# Patient Record
Sex: MALE | Race: WHITE | NOT HISPANIC OR LATINO | Employment: FULL TIME | URBAN - METROPOLITAN AREA
[De-identification: names, ages, dates, MRNs, and addresses within clinical notes are randomized per-mention and may not be internally consistent; named-entity substitution may affect disease eponyms.]

---

## 2017-05-31 ENCOUNTER — COMMUNICATION - HEALTHEAST (OUTPATIENT)
Dept: TELEHEALTH | Facility: CLINIC | Age: 32
End: 2017-05-31

## 2017-05-31 ENCOUNTER — OFFICE VISIT - HEALTHEAST (OUTPATIENT)
Dept: INTERNAL MEDICINE | Facility: CLINIC | Age: 32
End: 2017-05-31

## 2017-05-31 DIAGNOSIS — Z00.00 ANNUAL PHYSICAL EXAM: ICD-10-CM

## 2017-05-31 DIAGNOSIS — E55.9 VITAMIN D DEFICIENCY: ICD-10-CM

## 2017-05-31 DIAGNOSIS — L05.91 PILONIDAL CYST: ICD-10-CM

## 2017-05-31 LAB
CHOLEST SERPL-MCNC: 170 MG/DL
FASTING STATUS PATIENT QL REPORTED: YES
HDLC SERPL-MCNC: 35 MG/DL
LDLC SERPL CALC-MCNC: 119 MG/DL
TRIGL SERPL-MCNC: 79 MG/DL

## 2017-06-06 ENCOUNTER — COMMUNICATION - HEALTHEAST (OUTPATIENT)
Dept: INTERNAL MEDICINE | Facility: CLINIC | Age: 32
End: 2017-06-06

## 2017-06-06 DIAGNOSIS — E55.9 VITAMIN D INSUFFICIENCY: ICD-10-CM

## 2017-06-07 ENCOUNTER — OFFICE VISIT - HEALTHEAST (OUTPATIENT)
Dept: SURGERY | Facility: CLINIC | Age: 32
End: 2017-06-07

## 2017-06-07 DIAGNOSIS — L05.91 PILONIDAL CYST: ICD-10-CM

## 2017-06-21 ENCOUNTER — AMBULATORY - HEALTHEAST (OUTPATIENT)
Dept: SURGERY | Facility: CLINIC | Age: 32
End: 2017-06-21

## 2017-06-21 DIAGNOSIS — L05.91 PILONIDAL CYST: ICD-10-CM

## 2017-06-21 ASSESSMENT — MIFFLIN-ST. JEOR: SCORE: 2024.88

## 2020-07-23 ENCOUNTER — APPOINTMENT (OUTPATIENT)
Dept: GENERAL RADIOLOGY | Facility: CLINIC | Age: 35
End: 2020-07-23
Attending: PHYSICIAN ASSISTANT
Payer: OTHER GOVERNMENT

## 2020-07-23 ENCOUNTER — HOSPITAL ENCOUNTER (EMERGENCY)
Facility: CLINIC | Age: 35
Discharge: HOME | End: 2020-07-23
Attending: PHYSICIAN ASSISTANT | Admitting: PHYSICIAN ASSISTANT
Payer: OTHER GOVERNMENT

## 2020-07-23 ENCOUNTER — APPOINTMENT (OUTPATIENT)
Dept: ULTRASOUND IMAGING | Facility: CLINIC | Age: 35
End: 2020-07-23
Attending: PHYSICIAN ASSISTANT
Payer: OTHER GOVERNMENT

## 2020-07-23 VITALS
WEIGHT: 236 LBS | DIASTOLIC BLOOD PRESSURE: 98 MMHG | TEMPERATURE: 98.2 F | HEART RATE: 80 BPM | SYSTOLIC BLOOD PRESSURE: 140 MMHG | RESPIRATION RATE: 18 BRPM | OXYGEN SATURATION: 96 % | BODY MASS INDEX: 32.01 KG/M2

## 2020-07-23 DIAGNOSIS — R07.9 CHEST PAIN: ICD-10-CM

## 2020-07-23 LAB
ALBUMIN SERPL-MCNC: 4 G/DL (ref 3.4–5)
ALP SERPL-CCNC: 91 U/L (ref 40–150)
ALT SERPL W P-5'-P-CCNC: 178 U/L (ref 0–70)
ANION GAP SERPL CALCULATED.3IONS-SCNC: 6 MMOL/L (ref 3–14)
AST SERPL W P-5'-P-CCNC: 107 U/L (ref 0–45)
BASOPHILS # BLD AUTO: 0.1 10E9/L (ref 0–0.2)
BASOPHILS NFR BLD AUTO: 0.8 %
BILIRUB DIRECT SERPL-MCNC: 0.2 MG/DL (ref 0–0.2)
BILIRUB SERPL-MCNC: 0.7 MG/DL (ref 0.2–1.3)
BUN SERPL-MCNC: 13 MG/DL (ref 7–30)
CALCIUM SERPL-MCNC: 9.2 MG/DL (ref 8.5–10.1)
CHLORIDE SERPL-SCNC: 108 MMOL/L (ref 94–109)
CO2 SERPL-SCNC: 25 MMOL/L (ref 20–32)
CREAT SERPL-MCNC: 0.99 MG/DL (ref 0.66–1.25)
DIFFERENTIAL METHOD BLD: ABNORMAL
EOSINOPHIL # BLD AUTO: 0.1 10E9/L (ref 0–0.7)
EOSINOPHIL NFR BLD AUTO: 1.2 %
ERYTHROCYTE [DISTWIDTH] IN BLOOD BY AUTOMATED COUNT: 12.4 % (ref 10–15)
GFR SERPL CREATININE-BSD FRML MDRD: >90 ML/MIN/{1.73_M2}
GLUCOSE SERPL-MCNC: 92 MG/DL (ref 70–99)
HCT VFR BLD AUTO: 53 % (ref 40–53)
HGB BLD-MCNC: 17.8 G/DL (ref 13.3–17.7)
IMM GRANULOCYTES # BLD: 0 10E9/L (ref 0–0.4)
IMM GRANULOCYTES NFR BLD: 0.4 %
LIPASE SERPL-CCNC: 102 U/L (ref 73–393)
LYMPHOCYTES # BLD AUTO: 2 10E9/L (ref 0.8–5.3)
LYMPHOCYTES NFR BLD AUTO: 26.4 %
MCH RBC QN AUTO: 30.8 PG (ref 26.5–33)
MCHC RBC AUTO-ENTMCNC: 33.6 G/DL (ref 31.5–36.5)
MCV RBC AUTO: 92 FL (ref 78–100)
MONOCYTES # BLD AUTO: 1.2 10E9/L (ref 0–1.3)
MONOCYTES NFR BLD AUTO: 15.9 %
NEUTROPHILS # BLD AUTO: 4.2 10E9/L (ref 1.6–8.3)
NEUTROPHILS NFR BLD AUTO: 55.3 %
NRBC # BLD AUTO: 0 10*3/UL
NRBC BLD AUTO-RTO: 0 /100
PLATELET # BLD AUTO: 175 10E9/L (ref 150–450)
POTASSIUM SERPL-SCNC: 3.8 MMOL/L (ref 3.4–5.3)
PROT SERPL-MCNC: 8.4 G/DL (ref 6.8–8.8)
RBC # BLD AUTO: 5.78 10E12/L (ref 4.4–5.9)
SODIUM SERPL-SCNC: 138 MMOL/L (ref 133–144)
TROPONIN I SERPL-MCNC: <0.015 UG/L (ref 0–0.04)
WBC # BLD AUTO: 7.5 10E9/L (ref 4–11)

## 2020-07-23 PROCEDURE — U0003 INFECTIOUS AGENT DETECTION BY NUCLEIC ACID (DNA OR RNA); SEVERE ACUTE RESPIRATORY SYNDROME CORONAVIRUS 2 (SARS-COV-2) (CORONAVIRUS DISEASE [COVID-19]), AMPLIFIED PROBE TECHNIQUE, MAKING USE OF HIGH THROUGHPUT TECHNOLOGIES AS DESCRIBED BY CMS-2020-01-R: HCPCS | Performed by: PHYSICIAN ASSISTANT

## 2020-07-23 PROCEDURE — 85025 COMPLETE CBC W/AUTO DIFF WBC: CPT | Performed by: PHYSICIAN ASSISTANT

## 2020-07-23 PROCEDURE — 71046 X-RAY EXAM CHEST 2 VIEWS: CPT

## 2020-07-23 PROCEDURE — 76705 ECHO EXAM OF ABDOMEN: CPT

## 2020-07-23 PROCEDURE — 83690 ASSAY OF LIPASE: CPT | Performed by: PHYSICIAN ASSISTANT

## 2020-07-23 PROCEDURE — 80076 HEPATIC FUNCTION PANEL: CPT | Performed by: PHYSICIAN ASSISTANT

## 2020-07-23 PROCEDURE — 80048 BASIC METABOLIC PNL TOTAL CA: CPT | Performed by: PHYSICIAN ASSISTANT

## 2020-07-23 PROCEDURE — 99285 EMERGENCY DEPT VISIT HI MDM: CPT | Mod: 25

## 2020-07-23 PROCEDURE — C9803 HOPD COVID-19 SPEC COLLECT: HCPCS

## 2020-07-23 PROCEDURE — 84484 ASSAY OF TROPONIN QUANT: CPT | Performed by: PHYSICIAN ASSISTANT

## 2020-07-23 RX ORDER — SUCRALFATE ORAL 1 G/10ML
1 SUSPENSION ORAL 4 TIMES DAILY
Qty: 280 ML | Refills: 0 | Status: SHIPPED | OUTPATIENT
Start: 2020-07-23 | End: 2020-07-30

## 2020-07-23 ASSESSMENT — ENCOUNTER SYMPTOMS
NAUSEA: 0
VOMITING: 0
COUGH: 0
DIARRHEA: 0
CHILLS: 1
DIAPHORESIS: 1
BLOOD IN STOOL: 0
RHINORRHEA: 0
WHEEZING: 0
SHORTNESS OF BREATH: 1
SORE THROAT: 0
FEVER: 1

## 2020-07-23 NOTE — ED PROVIDER NOTES
History     Chief Complaint:  Shortness of Breath      HPI   Gage Clarke is a 35 year old male who presents with multiple complaints. 5 days ago he had onset of subjective fever, chills, and sweats. The following day he experienced onset of epigastric/lower central chest pain best described as heart burn. This temporarily would resolve with Pepcid/Tums however over the last 3 days the episodes would become more frequent. Yesterday the pain became constant. He also notes coinciding shortness of breath with exertion. Denies congestion, rhinorrhea, sore throat, coughing, wheezing, nausea, vomiting, diarrhea, constipation, dark or bloody stools, urinary changes.  Admits to ETOH daily.     Allergies:  Penicillins    Medications:    The patient is not currently taking any prescribed medications.    Past Medical History:    Eustachian tube dysfunction, bilateral  Depression with anxiety    Past Surgical History:    Cystectomy, pilonidal  Warners teeth extraction  Tonsillectomy  Ear tube placements    Family History:    Mother: Hypertension  Father: Hypertension    Social History:  Smoking Status: Current Every Day moker  Smokeless Tobacco: Never Used  Alcohol Use: Positive  Drug Use: Negative  Marital Status:      Review of Systems   Constitutional: Positive for chills, diaphoresis and fever.   HENT: Negative for congestion, rhinorrhea and sore throat.    Respiratory: Positive for shortness of breath. Negative for cough and wheezing.    Cardiovascular: Positive for chest pain.   Gastrointestinal: Negative for blood in stool, diarrhea, nausea and vomiting.   All other systems reviewed and are negative.    Physical Exam     Patient Vitals for the past 24 hrs:   BP Temp Temp src Pulse Heart Rate Resp SpO2 Weight   07/23/20 1410 -- -- -- -- -- -- 96 % --   07/23/20 1405 -- -- -- -- -- -- 96 % --   07/23/20 1400 (!) 135/100 -- -- 65 -- -- 98 % --   07/23/20 1345 (!) 137/98 -- -- 76 -- -- 97 % --   07/23/20 1330 (!)  140/102 -- -- -- -- -- -- --   07/23/20 1322 (!) 149/108 98.2  F (36.8  C) Oral -- 68 16 97 % 107 kg (236 lb)     Physical Exam  Vitals signs and nursing note reviewed.   Constitutional:       General: He is not in acute distress.     Appearance: He is not diaphoretic.   HENT:      Head: Normocephalic and atraumatic.   Eyes:      General: No scleral icterus.     Extraocular Movements: Extraocular movements intact.   Cardiovascular:      Rate and Rhythm: Normal rate.      Pulses: Normal pulses.      Heart sounds: Normal heart sounds.   Pulmonary:      Effort: Pulmonary effort is normal. No respiratory distress.      Breath sounds: Normal breath sounds.   Abdominal:      General: Bowel sounds are normal. There is no distension.      Palpations: Abdomen is soft.      Tenderness: There is abdominal tenderness in the epigastric area. There is no guarding.   Musculoskeletal:         General: No tenderness.   Skin:     General: Skin is warm.      Findings: No rash.   Neurological:      Mental Status: He is alert.       Emergency Department Course     ECG:  ECG taken at 1332, ECG read at 1337  Normal sinus rhythm  Normal ECG  Rate 78 bpm. RI interval 128 ms. QRS duration 86 ms. QT/QTc 374/426 ms. P-R-T axes 39 18 36.    Imaging:  Radiology findings were communicated with the patient who voiced understanding of the findings.    US Abdomen Limited  1.  Normal appearance of the gallbladder. No gallstones. Negative  sonographic Levin sign. No biliary dilatation.  2.  Fatty liver. Enlarged liver.  Reading per radiology.    Chest XR,  PA & LAT  Heart size is normal. No pleural effusion, pneumothorax,  or abnormal area of consolidation.  Reading per radiology.    Laboratory:  Laboratory findings were communicated with the patient who voiced understanding of the findings.    CBC: WBC 7.5, HGB 17.8(H),   BMP: WNL (Creatinine 0.99)  Hepatic panel: Bili Direct 0.2, Bili Total 0.7, Albumin 4.0, Protein Total 8.4, Alkphos 91,  (H), (H)  Troponin (Collected 1337): <0.015  Lipase: 102    Symptomatic COVID-19 by PCR: In process      Emergency Department Course:    1324 Nursing notes and vitals reviewed.    1329 I performed an exam of the patient as documented above.     1337 IV was inserted and blood was drawn for laboratory testing, results above. Patient swabbed for COVID-19.    1415 The patient was sent for an US while in the emergency department, results above.     1427 The patient was sent for an XR while in the emergency department, results above.     Patient rechecked and updated.     1550 Findings and plan explained to the patient. Patient discharged home with instructions regarding supportive care, medications, and reasons to return. The importance of close follow-up was reviewed. The patient was prescribed omeprazole and Carafate.    Impression & Plan      Medical Decision Making:  Gage Clarke is a 35 year old male who presents to the emergency department today for evaluation of chest/abdominal pain. He voices symptoms being chest pain however location of discomfort appears epigastric. His symptoms are likely 2/2 GI source however he does voice some exertional dyspnea.   EKG is unremarkable without STEMI or dysrhythmia. Given the duration of chest pain, single troponin appears adequate for evaluation of NSTEMI. Clinically   HEART score low risk, low suspicion for ACS/CAD at this time. Wells low risk, PERC negative, and pulmonary embolism considered but unlikely. Denies symptoms most suggestive of aortic dissection or expanding aneurysm. CXR is without pneumothorax, CHF/pulmonary edema, pneumonia. Clinically not most c/w cardiac tamponade. No findings of pancreatitis, cholelithiasis, choledocholithiasis at this time. LFT likely 2/2 alcohol use. COVID19 testing sent, no evidence of respiratory distress or increased effort at this time.     Covid-19   Gage Clarke was evaluated during a global COVID-19 pandemic, which  necessitated consideration that the patient might be at risk for infection with the SARS-CoV-2 virus that causes COVID-19.   Applicable protocols for evaluation were followed during the patient's care.   COVID-19 was considered as part of the patient's evaluation. The plan for testing is:  a test was obtained during this visit.      Diagnosis:    ICD-10-CM    1. Chest pain  R07.9      Disposition:   Patient was discharged home.    Discharge Medications:  New Prescriptions    OMEPRAZOLE (PRILOSEC) 20 MG DR CAPSULE    Take 1 capsule (20 mg) by mouth daily for 28 days    SUCRALFATE (CARAFATE) 1 GM/10ML SUSPENSION    Take 10 mLs (1 g) by mouth 4 times daily for 7 days       Scribe Disclosure:  I, Micheal Diez, am serving as a scribe at 1:26 PM on 7/23/2020 to document services personally performed by Kev Severino PA based on my observations and the provider's statements to me.    [facility] EMERGENCY DEPARTMENT       Kev Severino PA-C  07/23/20 0525

## 2020-07-23 NOTE — ED TRIAGE NOTES
A&O x4, ABCs intact. Pt presents with chills, SOB, cough, and HA fof the past 4 days. Pt states that he feels feverish, but denies checking his temperature. Pt also has some midsternal chest pain. Laying on his back helps with the chest pain.

## 2020-07-23 NOTE — ED AVS SNAPSHOT
[facility] Emergency Department  [address]  [facility] [address]  Phone:  [phone]  Fax:  [phone]                                    Gage Clarke   MRN: [id number]    Department:  [facility] Emergency Department   Date of Visit:  7/23/2020           After Visit Summary Signature Page    I have received my discharge instructions, and my questions have been answered. I have discussed any challenges I see with this plan with the nurse or doctor.    ..........................................................................................................................................  Patient/Patient Representative Signature      ..........................................................................................................................................  Patient Representative Print Name and Relationship to Patient    ..................................................               ................................................  Date                                   Time    ..........................................................................................................................................  Reviewed by Signature/Title    ...................................................              ..............................................  Date                                               Time          22EPIC Rev 08/18

## 2020-07-24 DIAGNOSIS — Z71.89 COUNSELING AND COORDINATION OF CARE: Primary | ICD-10-CM

## 2020-07-24 LAB
INTERPRETATION ECG - MUSE: NORMAL
SARS-COV-2 RNA SPEC QL NAA+PROBE: NOT DETECTED
SPECIMEN SOURCE: NORMAL

## 2020-07-24 NOTE — PROGRESS NOTES
Received notification of ED visit with COVID -19 testing done and no PCP listed for follow up care. Referral for care coordination services entered to outreach to patient.    Jessica Puga RN  [facility] Primary [facility]-Care Coordination  [facility]-Integrated Primary Care  [facility]  [phone]

## 2020-07-28 ENCOUNTER — PATIENT OUTREACH (OUTPATIENT)
Dept: CARE COORDINATION | Facility: CLINIC | Age: 35
End: 2020-07-28

## 2020-07-28 DIAGNOSIS — Z20.822 COVID-19 RULED OUT: Primary | ICD-10-CM

## 2020-07-28 NOTE — PROGRESS NOTES
Clinic Care Coordination Contact  [facility]/Voicemail       Clinical Data: Care Coordinator Outreach. Received notification of ED visit with COVID -19 testing done and no PCP listed for follow up care. Referral for care coordination services entered to outreach to patient.    Outreach attempted x 1.  Left message on patient's voicemail with call back information and requested return call.  Plan: Care Coordinator will try to reach patient again in 1-2 business days.    Jessica Puga RN  [facility] Primary Care-Care Coordination  [facility]-Integrated Primary Care  [facility]  [phone]

## 2020-07-30 NOTE — PROGRESS NOTES
Clinic Care Coordination Contact  [facility]/Voicemail       Clinical Data: Care Coordinator Outreach  Outreach attempted x 2.  Left message on patient's voicemail with phone number for [facility] should he need care and not have an established primary care provider  Plan:  Care Coordinator will do no further outreaches at this time.    Jessica Puga RN  [facility] Primary Care-Care Coordination  [facility]  [facility]  [phone]

## 2020-11-29 ENCOUNTER — HEALTH MAINTENANCE LETTER (OUTPATIENT)
Age: 35
End: 2020-11-29

## 2021-05-31 VITALS — BODY MASS INDEX: 31.9 KG/M2 | WEIGHT: 235.2 LBS

## 2021-05-31 VITALS — BODY MASS INDEX: 31.56 KG/M2 | HEIGHT: 72 IN | WEIGHT: 233 LBS

## 2021-05-31 VITALS — BODY MASS INDEX: 31.49 KG/M2 | WEIGHT: 232.2 LBS

## 2021-06-10 NOTE — PROGRESS NOTES
Subjective:     Gage Clarke is a 31 y.o. male who presents for an annual exam.     He had pilodynal cyst removed in 2010 and this past weekend his lower back started to get sore, and he had a fall down some stairs, yesterday morning woke up around 4 am with blood and fluid coming out. He says it will usually start draining and reappear every 4 moths or so, would like to talk about potential for surgery    The patient reports that there is not domestic violence in his life.     Healthy Habits:   Regular Exercise: No  Sunscreen Use: Yes  Healthy Diet: No, a lot of meat, not much veggies  Dental Visits Regularly: Yes  Sexually active: Yes  Colonoscopy: No    Immunization History   Administered Date(s) Administered     DTaP, historic 1984, 1984, 01/10/1985, 04/12/1989, 11/22/1989, 04/10/1996     Hep B, historic 04/10/1996, 05/21/1996, 12/13/1996     IPV 1984, 1984, 01/10/1985, 04/12/1989     Influenza, inj, historic 10/31/2007, 02/28/2011     Influenza,live, Nasal Laiv4 10/18/2013     MMR 07/22/1986, 04/10/1996     Pneumo Polysac 23-V 02/28/2011     Td, historic 04/10/1996     Tdap 10/31/2007, 08/14/2012     Immunization status: up to date and documented.    No current outpatient prescriptions on file.     No current facility-administered medications for this visit.      No past medical history on file.  Past Surgical History:   Procedure Laterality Date     PILONIDAL CYST / SINUS EXCISION  2011     [place] REMV PILONIDAL LESION SIMPLE      Description: Pilonidal Cyst Resection;  Recorded: 10/18/2013;     Penicillins  Family History   Problem Relation Age of Onset     Hypertension Mother      Hypertension Father      Hypertension Maternal Grandmother      Hypertension Maternal Grandfather      Diabetes Maternal Grandfather      Hypertension Paternal Grandmother      Hypertension Paternal Grandfather      Social History     Social History     Marital status: Single     Spouse name: N/A     Number  of children: N/A     Years of education: N/A     Occupational History     Not on file.     Social History Main Topics     Smoking status: Current Every Day Smoker     Packs/day: 0.50     Smokeless tobacco: Not on file     Alcohol use 6.0 oz/week     10 Cans of beer per week     Drug use: Not on file     Sexual activity: Not on file     Other Topics Concern     Not on file     Social History Narrative       Review of Systems  General:  Negative except as noted above  Eyes: Negative except as noted above  Ears/Nose/Throat: Negative except as noted above  Cardiovascular: Negative except as noted above  Respiratory:  Negative except as noted above  Gastrointestinal:  Negative except as noted above  Genitourinary: Negative except as noted above  Musculoskeletal:  Negative except as noted above  Skin: Negative except as noted above  Neurologic: Negative except as noted above  Psychiatric: Negative except as noted above  Endocrine: Negative except as noted above  Heme/Lymphatic:Negative except as noted above   Allergic/Immunologic: Negative except as noted above      Objective:     /72 (Patient Site: Right Arm, Patient Position: Sitting, Cuff Size: Adult Large)  Pulse 76  Wt (!) 232 lb 3.2 oz (105.3 kg)  BMI 31.49 kg/m2    Physical  General Appearance: Alert, cooperative, no distress, appears stated age  Head: Normocephalic, without obvious abnormality, atraumatic  Eyes: conjunctiva/corneas clear, EOM's intact  Ears: Normal TM's and external ear canals, both ears  Nose: Nares normal, septum midline,mucosa normal, no drainage  Throat: Lips, mucosa, and tongue normal.  Neck: Supple, symmetrical, trachea midline, no adenopathy;  thyroid: not enlarged, symmetric, no tenderness/mass/nodules.  Back: Symmetric, no curvature, ROM normal, no CVA tenderness  Lungs: Clear to auscultation bilaterally, respirations unlabored  Heart: Regular rate and rhythm, S1 and S2 normal, no murmur, rub, or gallop,  Abdomen: Soft,  non-tender, bowel sounds active all four quadrants,  no masses, no organomegaly  Genitourinary: Penis normal.  No scrotal masses.  No hernias palpated.  Musculoskeletal: Normal range of motion. No joint swelling or deformity.   Extremities: no cyanosis or edema  Skin: 1.5 cm draining pilonidal cyst, saginous/clear fluid, no streaking or surrounding erythema.   Lymph nodes: Cervical, supraclavicular, and axillary nodes normal  Neurologic: He is alert. no gross neurological deficits   Psychiatric: He has a normal mood and affect.     Assessment:     Healthy male exam.    1. Annual physical exam  Lipid Cascade    Glucose    Glucose   2. Vitamin D deficiency  Vitamin D, Total (25-Hydroxy)   3. Pilonidal cyst  Ambulatory referral to General Surgery        Plan:      Actively draining pilonidal cyst - given frequent recurrence will refer to surgery for evaluation - discussed wound care and dressing changes. No signs of infection.   Recheck Vit D  recheck lipid panel  Glucose  Dicussed healthy diet and excercise  I recommended he eat a healthy diet and get regular exercise.    Reji Davidson MD  [facility]

## 2021-06-11 NOTE — PROGRESS NOTES
GENERAL SURGICAL CONSULTATION    I was requested by Reji Davidson MD to consult on this pt to evaluate them for recurrent santo cyst    HPI:  This is a 31 y.o. male here today with a santo cyst which was troubling him around 2010.  He endend up having the cyst excised with packing of the wound in 2011.  He feels this wound never completely healed.  About 3 weeks ago this area got sore and he was having some drainage.  2 weeks ago it became so sore that he could not even sit.  It opened up and drained about a week ago.  He has not started on any antibiotics.       Allergies:Penicillins    No past medical history on file.    Past Surgical History:   Procedure Laterality Date     PILONIDAL CYST / SINUS EXCISION  2011     [place] REMV PILONIDAL LESION SIMPLE      Description: Pilonidal Cyst Resection;  Recorded: 10/18/2013;       CURRENT MEDS:  Current Outpatient Prescriptions   Medication Sig Dispense Refill     ergocalciferol (ERGOCALCIFEROL) 50,000 unit capsule Take 1 capsule (50,000 Units total) by mouth once a week for 8 doses. 8 capsule 0     No current facility-administered medications for this visit.        Family History   Problem Relation Age of Onset     Hypertension Mother      Hypertension Father      Hypertension Maternal Grandmother      Hypertension Maternal Grandfather      Diabetes Maternal Grandfather      Hypertension Paternal Grandmother      Hypertension Paternal Grandfather      Family history is not pertinent to this patients Chief Complaint.     reports that he has been smoking.  He has been smoking about 0.50 packs per day. He does not have any smokeless tobacco history on file. He reports that he drinks about 6.0 oz of alcohol per week     Review of Systems -   10 point Review of systems is negative except for; as mentioned above in HPI and PMHx    BP (!) 142/91  Pulse 89  Wt (!) 235 lb 3.2 oz (106.7 kg)  SpO2 94%  BMI 31.9 kg/m2  Body mass index is 31.9 kg/(m^2).    EXAM:  GENERAL: Well  developed male  HEENT: EOMI, Anicteric Sclera, Moist Mucous Membranes,  In Mouth the pt does not have redness or bleeding gums  CARDIOVASCULAR: RRR w/out murmur   CHEST/LUNG: Clear to Auscultation  ABDOMEN:  Non tender to palpation, +BS  MUSCULOSKELETAL:  No deformities with good range of motion in all extremities  NEURO: He is ambulatory with good strength in both legs.  HEME/LYMPH: No Cervical Adenopathy or tenderness.   Rectal exam: In the intergluteal cleft overlying the coccyx the patient has an area which is draining purulent fluid.  The fluid drainage looks to be coming from the wound right in the central part of the healing incision.    IMAGES:  None    Assessment/Plan:  Patient status post excision of a pilonidal cyst.  The upper portion of this healed area was pilonidal cyst used to reside has developed an infection.  This patient would benefit from opening and debriding this part of the wound.  By packing the wound, the edges of this area should heal back in nicely.    The patient was given instructions to do wet-to-dry dressing changes and to pack this wound daily.  He was told it is okay to shower or bathe and then pack the wound after bathing.  He should follow-up with me in 2 weeks to evaluate his progress.      Procedure note:  The skin and infected areas prepped with Betadine  The tissue overlying the abscess was cut open with an 11 blade scalpel.  The wound was swabbed out with gauze.  The wound was packed with a 2 x 2 gauze covered with gauze and tape.      Jf Vinson MD  [facility]  [phone]

## 2021-06-11 NOTE — PROGRESS NOTES
"HPI: Gage Clarke is a 31 y.o. male who is following up today after a incision and drainage of a pilonidal cyst on June 6th. He is packing the wound with nugauze daily. No fevers or chills.   He currently is not having any pain or discomfort associated with the pilonidal cyst/wound.  He stopped packing the wound a day or 2 ago and is not seen any drainage from this area.    Allergies:Penicillins    No past medical history on file.    Past Surgical History:   Procedure Laterality Date     PILONIDAL CYST / SINUS EXCISION  2011     [place] REMV PILONIDAL LESION SIMPLE      Description: Pilonidal Cyst Resection;  Recorded: 10/18/2013;       CURRENT MEDS:      Family History   Problem Relation Age of Onset     Hypertension Mother      Hypertension Father      Hypertension Maternal Grandmother      Hypertension Maternal Grandfather      Diabetes Maternal Grandfather      Hypertension Paternal Grandmother      Hypertension Paternal Grandfather         reports that he has been smoking.  He has been smoking about 0.50 packs per day. He has never used smokeless tobacco. He reports that he drinks about 6.0 oz of alcohol per week  He reports that he does not use illicit drugs.    Review of Systems:  The 12 system review is within normal limits except for as mentioned above.        /81  Pulse 79  Ht 6' (1.829 m)  Wt (!) 233 lb (105.7 kg)  SpO2 96%  BMI 31.6 kg/m2  Body mass index is 31.6 kg/(m^2).    EXAM:  GENERAL: Well developed male   SKIN: The line in the middle the intergluteal cleft is slightly purple from the new scar tissue which is developed after a pilonidal cyst excision.  The small cyst portion which was identified on her last visit has \"closed up very nicely and there is no open wound at this point..  CARDIAC: RRR w/out murmur  CHEST/LUNG: Clear to ascultation, No wheezes  ABDOMEN: Soft with, +Bowel Sounds  BACK: No CVA tenderness,   NEURO:No focal deficits, ambulatory  LYMPH: No Axillary or inguinal " Adenopathy  EXTREMITIES: Ambulatory, No lower extremity edema      LABS:  No results found for: WBC, HGB, HCT, MCV, PLT  INR/Prothrombin Time      No results found for: ALT, AST, GGT, ALKPHOS, BILITOT      Assessment/Plan: Pt with a pilonidal cyst which she had open back up with a small blister.  This area was cleaned he has been packing it and caring for it appropriately.  Is now healed over again.  I encouraged him to take it easy for the next week or 2 has vigorous activity might aggravate this area.  Otherwise he is able to get back to normal activities I think his wound is healing very well.      Jf Vinson MD  [facility] Department of Surgery

## 2021-09-25 ENCOUNTER — HEALTH MAINTENANCE LETTER (OUTPATIENT)
Age: 36
End: 2021-09-25

## 2022-01-09 ENCOUNTER — HEALTH MAINTENANCE LETTER (OUTPATIENT)
Age: 37
End: 2022-01-09

## 2022-12-26 ENCOUNTER — HEALTH MAINTENANCE LETTER (OUTPATIENT)
Age: 37
End: 2022-12-26

## 2023-02-02 ENCOUNTER — APPOINTMENT (OUTPATIENT)
Dept: GENERAL RADIOLOGY | Facility: CLINIC | Age: 38
End: 2023-02-02
Attending: EMERGENCY MEDICINE
Payer: COMMERCIAL

## 2023-02-02 ENCOUNTER — HOSPITAL ENCOUNTER (EMERGENCY)
Facility: CLINIC | Age: 38
Discharge: HOME | End: 2023-02-03
Attending: EMERGENCY MEDICINE | Admitting: EMERGENCY MEDICINE
Payer: COMMERCIAL

## 2023-02-02 DIAGNOSIS — S82.891A CLOSED FRACTURE OF RIGHT ANKLE, INITIAL ENCOUNTER: ICD-10-CM

## 2023-02-02 PROCEDURE — 99284 EMERGENCY DEPT VISIT MOD MDM: CPT | Mod: 25

## 2023-02-02 PROCEDURE — 250N000013 HC RX MED GY IP 250 OP 250 PS 637: Performed by: EMERGENCY MEDICINE

## 2023-02-02 PROCEDURE — 73610 X-RAY EXAM OF ANKLE: CPT | Mod: RT

## 2023-02-02 PROCEDURE — 27824 TREAT LOWER LEG FRACTURE: CPT | Mod: RT

## 2023-02-02 RX ORDER — OXYCODONE HYDROCHLORIDE 5 MG/1
10 TABLET ORAL ONCE
Status: COMPLETED | OUTPATIENT
Start: 2023-02-02 | End: 2023-02-02

## 2023-02-02 RX ORDER — OXYCODONE AND ACETAMINOPHEN 5; 325 MG/1; MG/1
1 TABLET ORAL EVERY 6 HOURS PRN
Qty: 6 TABLET | Refills: 0 | Status: SHIPPED | OUTPATIENT
Start: 2023-02-02

## 2023-02-02 RX ORDER — OXYCODONE HYDROCHLORIDE 5 MG/1
5-10 TABLET ORAL EVERY 6 HOURS PRN
Qty: 12 TABLET | Refills: 0 | Status: SHIPPED | OUTPATIENT
Start: 2023-02-02

## 2023-02-02 RX ORDER — ACETAMINOPHEN 500 MG
1000 TABLET ORAL ONCE
Status: COMPLETED | OUTPATIENT
Start: 2023-02-02 | End: 2023-02-02

## 2023-02-02 RX ORDER — OXYCODONE AND ACETAMINOPHEN 5; 325 MG/1; MG/1
2 TABLET ORAL ONCE
Status: DISCONTINUED | OUTPATIENT
Start: 2023-02-02 | End: 2023-02-02

## 2023-02-02 RX ORDER — IBUPROFEN 600 MG/1
600 TABLET, FILM COATED ORAL ONCE
Status: COMPLETED | OUTPATIENT
Start: 2023-02-02 | End: 2023-02-02

## 2023-02-02 RX ADMIN — IBUPROFEN 600 MG: 600 TABLET, FILM COATED ORAL at 22:52

## 2023-02-02 RX ADMIN — ACETAMINOPHEN 1000 MG: 500 TABLET ORAL at 21:33

## 2023-02-02 RX ADMIN — OXYCODONE HYDROCHLORIDE 10 MG: 5 TABLET ORAL at 22:51

## 2023-02-02 ASSESSMENT — ENCOUNTER SYMPTOMS
ARTHRALGIAS: 1
NUMBNESS: 0

## 2023-02-02 ASSESSMENT — ACTIVITIES OF DAILY LIVING (ADL): ADLS_ACUITY_SCORE: 35

## 2023-02-03 VITALS
SYSTOLIC BLOOD PRESSURE: 144 MMHG | TEMPERATURE: 97.7 F | DIASTOLIC BLOOD PRESSURE: 94 MMHG | OXYGEN SATURATION: 99 % | HEART RATE: 102 BPM | RESPIRATION RATE: 18 BRPM

## 2023-02-03 NOTE — ED TRIAGE NOTES
"Pt slipped and twisted R ankle while falling down. Pt states \"it was twisted at an angle it shouldn't be at. Pt did not take anything for the pain. Injury occurred 30 min PTA. Pt is not on blood thinner, did not hit head. Pt R ankle is swollen, pt has sensation distal to injury.       "

## 2023-02-03 NOTE — ED PROVIDER NOTES
History     Chief Complaint:  Fall       HPI   Gage Clarke is a 37 year old male who presents with right ankle pain and swelling after a fall earlier this evening.  Patient was stepping out of his car when he slipped on the ice and twisted the ankle.  He has not been able to bear any weight since the injury.  He denies any other injuries.  His pain is severe.  He did not take anything for pain prior to arrival.      Independent Historian: None    Review of External Notes: None    ROS:  Review of Systems   Musculoskeletal: Positive for arthralgias.   Neurological: Negative for numbness.         Allergies:  Penicillins     Medications:    IBUPROFEN PO        Past Medical History:    Pilonidal cyst  Depression with anxiety    Past Surgical History:    Past Surgical History:   Procedure Laterality Date     CYSTECTOMY PILONIDAL  9/2011     PILONIDAL CYST / SINUS EXCISION  2011     REMV PILONIDAL LESION SIMPLE      Description: Pilonidal Cyst Resection;  Recorded: 10/18/2013;        Family History:    family history includes Diabetes in his maternal grandfather; Hypertension in his father, maternal grandfather, maternal grandmother, mother, paternal grandfather, and paternal grandmother; Other Cancer in his paternal grandmother.    Social History:   reports that he has been smoking. He has been smoking an average of .5 packs per day. He has never used smokeless tobacco. He reports current alcohol use of about 10.0 standard drinks per week. He reports that he does not use drugs.  PCP: No Ref-Primary, Physician     Physical Exam     Patient Vitals for the past 24 hrs:   BP Temp Temp src Pulse Resp SpO2   02/03/23 0002 -- -- -- -- 18 99 %   02/02/23 2129 (!) 144/94 97.7  F (36.5  C) Oral 102 18 100 %        Physical Exam  Vitals and nursing note reviewed.   Constitutional:       General: He is not in acute distress.     Appearance: He is not ill-appearing.   HENT:      Head: Normocephalic and atraumatic.      Right  Ear: External ear normal.      Left Ear: External ear normal.   Eyes:      Extraocular Movements: Extraocular movements intact.      Conjunctiva/sclera: Conjunctivae normal.   Cardiovascular:      Pulses:           Dorsalis pedis pulses are 2+ on the right side.        Posterior tibial pulses are 2+ on the right side.   Pulmonary:      Effort: Pulmonary effort is normal. No respiratory distress.   Musculoskeletal:         General: No deformity or signs of injury.      Cervical back: Normal range of motion and neck supple.      Right ankle: Swelling present. No deformity. Tenderness present over the lateral malleolus and medial malleolus. Decreased range of motion.      Right Achilles Tendon: No tenderness.      Right foot: No tenderness.   Skin:     General: Skin is warm and dry.      Findings: No rash.   Neurological:      Mental Status: He is alert and oriented to person, place, and time.      Sensory: No sensory deficit.   Psychiatric:         Behavior: Behavior normal.           Emergency Department Course     Imaging:  Ankle XR, G/E 3 views, right   Final Result   IMPRESSION: Mildly distracted, transverse fracture of the medial malleolus. Oblique, mildly displaced fracture of the lateral malleolus. The ankle mortise is disrupted and widened medially.         Report per radiology    Procedures     Splint Placement     Procedure: Splint Placement     Indication: Fracture    Consent: Verbal     Location: Right Ankle    Procedure detail:   Splint was applied by Myself  Splint type: Posterior short leg and stirrup   Splint materilal: Fiberglass  After placement I checked and adjusted the fit as needed to ensure proper positioning/fit   Sensation and circulation are intact after splint placement     Patient Status: The patient tolerated the procedure well: Yes. There were no complications.      Emergency Department Course & Assessments:             Interventions:  Medications   acetaminophen (TYLENOL) tablet 1,000 mg  (1,000 mg Oral Given 23)   ibuprofen (ADVIL/MOTRIN) tablet 600 mg (600 mg Oral Given 23)   oxyCODONE (ROXICODONE) tablet 10 mg (10 mg Oral Given 23)        Independent Interpretation (X-rays, CTs, rhythm strip):  Ankle x-ray shows fracture of the lateral and medial malleoli of the right ankle    Consultations/Discussion of Management or Tests:  None       Social Determinants of Health affecting care:  None    Disposition:  The patient was discharged to home.     Impression & Plan    CMS Diagnoses: None     Medical Decision Makin-year-old male presenting with right ankle injury.  X-ray confirms a fracture of the distal tibia and distal fibula.  He is neurovascular intact.  There do not appear to be any other injuries.  We will plan to splint and refer to orthopedics for follow-up.      Diagnosis:    ICD-10-CM    1. Closed fracture of right ankle, initial encounter  S82.891A            Discharge Medications:  Discharge Medication List as of 2023 11:48 PM      START taking these medications    Details   oxyCODONE (ROXICODONE) 5 MG tablet Take 1-2 tablets (5-10 mg) by mouth every 6 hours as needed for severe pain (7-10), Disp-12 tablet, R-0, E-Prescribe      oxyCODONE-acetaminophen (PERCOCET) 5-325 MG tablet Take 1 tablet by mouth every 6 hours as needed for severe pain (7-10), Disp-6 tablet, R-0, [employer]                2023   Cr Sky MD Goodwin, Shaun M, MD  [id number]

## 2023-04-16 ENCOUNTER — HEALTH MAINTENANCE LETTER (OUTPATIENT)
Age: 38
End: 2023-04-16

## 2024-06-23 ENCOUNTER — HEALTH MAINTENANCE LETTER (OUTPATIENT)
Age: 39
End: 2024-06-23

## 2025-07-12 ENCOUNTER — HEALTH MAINTENANCE LETTER (OUTPATIENT)
Age: 40
End: 2025-07-12